# Patient Record
(demographics unavailable — no encounter records)

---

## 2024-10-10 NOTE — HISTORY OF PRESENT ILLNESS
[0] : no pain reported [Xray (Date:___)] : [unfilled] Xray -  [Chills] : no chills [Constipation] : no constipation [Diarrhea] : no diarrhea [Dysuria] : no dysuria [Fever] : no fever [Nausea] : no nausea [Vomiting] : no vomiting [de-identified] : left THR 7/19/24 [de-identified] : This is a 64 year old F pt presents today s/p 12 weeks from left EMANUEL. Pt finished physical therapy and thinks she's able to go back to normal life. Pt has mild discomfort when getting up after long time of sitting. Pt is ambulating without use of any assistance device. Pt is overall happy with the surgical outcome. Denies significant pain. [de-identified] : Left lower extremity exam: Incisions well-healed without erythema drainage or discharge, hip full range of motion. no swelling of the lower extremity, calf compressible, 5 out of 5 strength for plantarflexion dorsiflexion, sensation intact light touch over the foot, foot warm perfused brisk cap refill. [de-identified] : AP pelvis and 2 views of the left hip show no acute fracture dislocation, there is a left total hip arthroplasty in appropriate alignment without evidence of hardware compromise. [de-identified] : Patient is a 64-year-old female here today 3-month status post left total hip arthroplasty with Fuentes.  Overall she is doing extremely well.  She continue weight-bear as tolerated.  She can take Tylenol as needed for any pain.  I will see her back in 1 year for radiographic surveillance.  All questions were asked and answered

## 2024-10-10 NOTE — ADDENDUM
[FreeTextEntry1] : This note was written by Sally Piedra, acting as the  for Dr. Roberts on 10/10/2024. This note accurately reflects the work and decisions made by Dr. Roberts.